# Patient Record
Sex: FEMALE | Race: WHITE | Employment: UNEMPLOYED | URBAN - METROPOLITAN AREA
[De-identification: names, ages, dates, MRNs, and addresses within clinical notes are randomized per-mention and may not be internally consistent; named-entity substitution may affect disease eponyms.]

---
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
## 2022-04-29 ENCOUNTER — TELEPHONE (OUTPATIENT)
Dept: FAMILY MEDICINE | Facility: CLINIC | Age: 22
End: 2022-04-29
Payer: COMMERCIAL

## 2022-04-29 NOTE — TELEPHONE ENCOUNTER
No future appointments.  Appointment Notes for this encounter:   Data Unavailable    Health Maintenance Due   Topic Date Due     PREVENTIVE CARE VISIT  Never done     ANNUAL REVIEW OF HM ORDERS  Never done     CHLAMYDIA SCREENING  Never done     ADVANCE CARE PLANNING  Never done     COVID-19 Vaccine (1) Never done     HIV SCREENING  Never done     HEPATITIS C SCREENING  Never done     HPV IMMUNIZATION (3 - 3-dose series) 03/06/2020     INFLUENZA VACCINE (1) 09/01/2021     PHQ-2 (once per calendar year)  01/01/2022     Health Maintenance addressed:  Preventative care visit.     LVM regarding need for preventative care visit.     MyChart Status:  Not active.